# Patient Record
Sex: MALE
[De-identification: names, ages, dates, MRNs, and addresses within clinical notes are randomized per-mention and may not be internally consistent; named-entity substitution may affect disease eponyms.]

---

## 2019-02-28 ENCOUNTER — HOSPITAL ENCOUNTER (EMERGENCY)
Dept: HOSPITAL 88 - ER | Age: 24
Discharge: HOME | End: 2019-02-28
Payer: SELF-PAY

## 2019-02-28 VITALS — BODY MASS INDEX: 22.22 KG/M2 | WEIGHT: 150 LBS | HEIGHT: 69 IN

## 2019-02-28 DIAGNOSIS — J11.1: ICD-10-CM

## 2019-02-28 DIAGNOSIS — R50.9: Primary | ICD-10-CM

## 2019-02-28 DIAGNOSIS — R05: ICD-10-CM

## 2019-02-28 LAB
FLUAV + FLUBV AG SPEC IF: (no result)
S PYO AG THROAT QL: NEGATIVE

## 2019-02-28 PROCEDURE — 87400 INFLUENZA A/B EACH AG IA: CPT

## 2019-02-28 PROCEDURE — 71046 X-RAY EXAM CHEST 2 VIEWS: CPT

## 2019-02-28 PROCEDURE — 83518 IMMUNOASSAY DIPSTICK: CPT

## 2019-02-28 PROCEDURE — 87070 CULTURE OTHR SPECIMN AEROBIC: CPT

## 2019-02-28 PROCEDURE — 99283 EMERGENCY DEPT VISIT LOW MDM: CPT

## 2019-02-28 NOTE — DIAGNOSTIC IMAGING REPORT
EXAMINATION:  CHEST 2 VIEWS    



INDICATION:      

^cough

^[id number]

^2018    



COMPARISON:  None

     

FINDINGS:  PA and lateral views



TUBES and LINES:  None.



LUNGS:  Lungs are well inflated.  Lungs are clear.   There is no evidence of

pneumonia or pulmonary edema.



PLEURA:  No pleural effusion or pneumothorax.



HEART AND MEDIASTINUM:  The cardiomediastinal silhouette is unremarkable.



BONES AND SOFT TISSUES:  No acute osseous lesion.  Soft tissues are

unremarkable.



UPPER ABDOMEN: No free air under the diaphragm. 



IMPRESSION: 

No acute thoracic abnormality.





Signed by: Dr. Honey Nassar M.D. on 2/28/2019 8:48 PM